# Patient Record
Sex: MALE | Race: WHITE
[De-identification: names, ages, dates, MRNs, and addresses within clinical notes are randomized per-mention and may not be internally consistent; named-entity substitution may affect disease eponyms.]

---

## 2019-08-01 ENCOUNTER — HOSPITAL ENCOUNTER (EMERGENCY)
Dept: HOSPITAL 91 - FTE | Age: 39
Discharge: HOME | End: 2019-08-01
Payer: COMMERCIAL

## 2019-08-01 ENCOUNTER — HOSPITAL ENCOUNTER (EMERGENCY)
Dept: HOSPITAL 10 - FTE | Age: 39
Discharge: HOME | End: 2019-08-01
Payer: COMMERCIAL

## 2019-08-01 VITALS — HEART RATE: 53 BPM | RESPIRATION RATE: 18 BRPM | DIASTOLIC BLOOD PRESSURE: 68 MMHG | SYSTOLIC BLOOD PRESSURE: 118 MMHG

## 2019-08-01 VITALS — HEIGHT: 60 IN | WEIGHT: 171.96 LBS | BODY MASS INDEX: 33.76 KG/M2
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
## 2019-08-01 DIAGNOSIS — S80.811A: Primary | ICD-10-CM

## 2019-08-01 DIAGNOSIS — S80.812A: ICD-10-CM

## 2019-08-01 PROCEDURE — 73630 X-RAY EXAM OF FOOT: CPT

## 2019-08-01 PROCEDURE — 73610 X-RAY EXAM OF ANKLE: CPT

## 2019-08-01 PROCEDURE — 99284 EMERGENCY DEPT VISIT MOD MDM: CPT

## 2019-08-01 RX ADMIN — IBUPROFEN 1 MG: 800 TABLET, FILM COATED ORAL at 13:07

## 2019-08-01 RX ADMIN — BACITRACIN ZINC 1 APPLIC: 500 OINTMENT TOPICAL at 14:02

## 2019-08-01 NOTE — ERD
ER Documentation


Chief Complaint


Chief Complaint





LAC RIGHT KNEE





HPI


39 year old male presents to ED with several surface abraisons and pain on his 


bilat feet. He states he was moving a fridge which he lost control of and fell 


on his feet. He reports significant pain 8/10 sharp, constant on bilat feet. He 


has not taken any meds for the pain. He also has several surface abraisons on 


his bilat LEs. Reports recent TDAP. Denies allergies. Denies past med hx.





ROS


All systems reviewed and are negative except as per history of present illness.





Medications


Home Meds


Active Scripts


Ibuprofen* (Motrin*) 800 Mg Tab, 800 MG PO Q6H PRN for PAIN AND OR ELEVATED 


TEMP, #30 TAB


   Prov:TANYA NOBLE PA-C         8/1/19


Neomycin Miramontes/Bacitrac Zn/Poly (Neosporin Ointment) 28.3 Gm Oint...g., 28.3 GM TP 


BID for 7 Days


   Prov:TANYA NOBLE PA-C         8/1/19


Reported Medications


[No Meds Taken]   No Conflict Check


   5/11/11





Allergies


Allergies:  


Coded Allergies:  


     No Known Drug Allergy (Verified  Allergy, Mild, 2/14/13)





PMhx/Soc


History of Surgery:  No


Anesthesia Reaction:  No


Hx Neurological Disorder:  No


Hx Respiratory Disorders:  No


Hx Cardiac Disorders:  No


Hx Psychiatric Problems:  No


Hx Miscellaneous Medical Probl:  No


Hx Alcohol Use:  No


Hx Substance Use:  No


Hx Tobacco Use:  No





FmHx


Family History:  No diabetes





Physical Exam


Vitals





Vital Signs


  Date      Temp  Pulse  Resp  B/P (MAP)   Pulse Ox  O2          O2 Flow    FiO2


Time                                                 Delivery    Rate


    8/1/19  97.6     53    18      118/68        99


     11:25                           (85)





Physical Exam


Const:   No acute distress


Head:   Atraumatic 


Resp:   Clear to auscultation bilaterally


Cardio:   Regular rate and rhythm, no murmurs


Abd:    Soft, non tender, non distended.


Skin:   Several surface abraisons to bilat LEs. Good ROM and sensation intact. 


Good 2+ pulses


Extrem:   Tenderness to top of bilat feet


Neur:   Awake and alert


Psych:    Normal Mood and Affect


Results 24 hrs





Current Medications


 Medications
   Dose
          Sig/Ish
       Start Time
   Status  Last


 (Trade)       Ordered        Route
 PRN     Stop Time              Admin
Dose


                              Reason                                Admin


 Ibuprofen
     800 mg         ONCE  ONCE
    8/1/19        DC            8/1/19


(Motrin)                      PO
            13:00
 8/1/19                13:07



                                             13:01


 Bacitracin
    1 applic       ONCE  ONCE
    8/1/19        DC       



(Bacitracin                   TOP
           14:00
 8/1/19


0.5%/
 Zinc                                  14:01


Oint)








Procedures/MDM


ED COURSE:


The patient was stable throughout ED course. I kept the patient informed of 


laboratory and diagnostic imaging results throughout the ED course.  





DIAGNOSTIC IMAGING:


Read by radiologist.


PROCEDURE:   XR Ankle. 


 


CLINICAL INDICATION:   Pain 


 


TECHNIQUE:   AP, oblique and lateral views of the right ankle were performed. 


 


COMPARISON:   None. 


 


FINDINGS:


There is normal mineralization and alignment. 


No acute fracture or osseous lesion is identified. 


The joints are normal. 


The soft tissues are unremarkable. 


 


RPTAT: AA


 


IMPRESSION:


Unremarkable right ankle.


_____________________________________________ 


.Jas Macario MD, MD           Date    Time 


Electronically viewed and signed by .Jas Macario MD, MD on 08/01/2019 


13:22 





PROCEDURE:   XR Ankle. 


 


CLINICAL INDICATION:   Pain 


 


TECHNIQUE:   AP, oblique and lateral views of the left ankle were performed. 


 


COMPARISON:   None. 


 


FINDINGS:


There is normal mineralization and alignment. 


No acute fracture or osseous lesion is identified. 


The joints are normal. 


The soft tissues are unremarkable. 


There is a small posterior calcaneal spur.


 


RPTAT: AA


 


IMPRESSION:


 


Small posterior calcaneal spur. No acute fracture.


_____________________________________________ 


.Jas Macario MD, MD           Date    Time 


Electronically viewed and signed by .Jas Macario MD, MD on 08/01/2019 


13:22 


 


PROCEDURE:   XR Foot. 


 


CLINICAL INDICATION:   pain 


 


TECHNIQUE:   AP, lateral and oblique views of the right foot was obtained.  The 


images were reviewed on a PACS workstation. 


 


COMPARISON:   None. 


 


FINDINGS:


The bones of the foot appear intact, with no evidence of acute fracture, 


dislocation, or subluxation. 


The joint spaces are preserved. 


Bone mineralization is normal. 


No significant soft tissue swelling is seen.


 


RPTAT: AA 


 


IMPRESSION:


Unremarkable right foot radiographs.


_____________________________________________ 


.Jas Macario MD, MD           Date    Time 


Electronically viewed and signed by .Jas Macario MD, MD on 08/01/2019 


13:21 





PROCEDURE:   XR Foot. 


 


CLINICAL INDICATION:   Pain 


 


TECHNIQUE:   AP, lateral and oblique views of the left foot was obtained.  The 


images were reviewed on a PACS workstation. 


 


COMPARISON:   None. 


 


FINDINGS:


 


The bones of the foot appear intact, with no evidence of fracture, dislocation, 


or subluxation. 


The joint spaces are preserved.


The bone mineralization is normal. 


No significant soft tissue swelling is seen. 


 


RPTAT: AA


IMPRESSION:


Unremarkable left foot radiographs.


_____________________________________________ 


.Jas Macario MD, MD           Date    Time 


Electronically viewed and signed by .Jas Macario MD, MD on 08/01/2019 


13:21 





PROCEDURES: wound irrigation, sterile strips applied 








MEDICATIONS GIVEN: 


Motrin


Patient tolerated medication well with no adverse reactions. Patient reported 


improvement in pain. 








MEDICAL DECISION MAKING:


Patient is a 39 year old male presenting with bilat foot pain s/p fridge falling


on his earlier today. He has several skin abraision on his bilat LEs. He has 


good 2+ pulses all around, good sensation, and good ROM and strength 5/5. Xray 


imaging showed unremarkable left/right foot/ankle.  No evidence of fractures, 


dislocations, compartment syndrome, neurologic injury, vascular injury, open 


joint, open fracture, tendon laceration, septic arthritis, osteomyelitis, DVT, 


foreign body, or other emergent conditions. 





Vital signs were reviewed. Patient is afebrile. Patient was not hypoxic. Patient


was hemodynamically stable. Patient was told to follow up with primary care for 


further care and management. 








PRESCRIPTION: motrin





DISCHARGE:


At this time, patient is stable for discharge and outpatient management. I have 


instructed the patient to follow-up with their primary care physician in 1-2 


days. I have discussed with the patient the possibility of needing to see a 


specialist for further workup and imaging studies if symptoms persist. I have 


instructed the patient to promptly return to the ER for any new or worsening 


symptoms including increased pain, fever, nausea, vomiting, weakness or LOC. The


patient expressed understanding of and agreement with this plan. All questions 


were answered. Home care instructions were provided. 





Disclaimer: Inadvertent spelling and grammatical errors are likely due to 


EHR/dictation software use and do not reflect on the overall quality of patient 


care. Also, please note that the electronic time recorded on this note does not 


necessarily reflect the actual time of the patient encounter.





Departure


Diagnosis:  


   Primary Impression:  


   Abrasion


Condition:  Fair


Patient Instructions:  Abrasion


Referrals:  


[facility]


YOU HAVE RECEIVED A MEDICAL SCREENING EXAM AND THE RESULTS INDICATE THAT YOU DO 


NOT HAVE A CONDITION THAT REQUIRES URGENT TREATMENT IN THE EMERGENCY DEPARTMENT.





FURTHER EVALUATION AND TREATMENT OF YOUR CONDITION CAN WAIT UNTIL YOU ARE SEEN 


IN YOUR DOCTORS OFFICE WITHIN THE NEXT 1-2 DAYS. IT IS YOUR RESPONSIBILITY TO 


MAKE AN APPOINTMENT FOR FOLOW-UP CARE.





IF YOU HAVE A PRIMARY DOCTOR


--you should call your primary doctor and schedule an appointment





IF YOU DO NOT HAVE A PRIMARY DOCTOR YOU CAN CALL OUR PHYSICIAN REFERRAL HOTLINE 


AT


 [phone] 





IF YOU CAN NOT AFFORD TO SEE A PHYSICIAN YOU CAN CHOSE FROM THE FOLLOWING 


[facility] [phone] [address] [facility]. [facility] [phone] [address] [facility]. [facility] [phone] [address] [facility]. [facility] [phone] [address] [facility]. [facility] [phone] [address] [facility]. [facility]. [phone]


[address] [facility]. [facility]


YOU HAVE RECEIVED A MEDICAL SCREENING EXAM AND THE RESULTS INDICATE THAT YOU DO 


NOT HAVE A CONDITION THAT REQUIRES URGENT TREATMENT IN THE EMERGENCY DEPARTMENT.





FURTHER EVALUATION AND TREATMENT OF YOUR CONDITION CAN WAIT UNTIL YOU ARE SEEN 


IN YOUR DOCTORS OFFICE WITHIN THE NEXT 1-2 DAYS. IT IS YOUR RESPONSIBILITY TO 


MAKE AN APPOINTMENT FOR FOLOW-UP CARE.





IF YOU HAVE A PRIMARY DOCTOR


--you should call your primary doctor and schedule and appointment





IF YOU DO NOT HAVE A PRIMARY DOCTOR YOU CAN CALL OUR PHYSICIAN REFERRAL HOTLINE 


AT [phone].





IF YOU CAN NOT AFFORD TO SEE A PHYSICIAN YOU CAN CHOSE FROM THE FOLLOWING [facility]


INSTITUTIONS:





[facility]


[address] [place] [address]





[facility]


[address] [place] [address]





[facility]


[address] [place] [address]





Additional Instructions:  


Call your primary care doctor TOMORROW for an appointment during the next 1-2 


days.See the doctor sooner or return here if your condition worsens before your 


appointment time.











TANYA NOBLE PA-C            Aug 1, 2019 13:37